# Patient Record
Sex: MALE | Race: WHITE | NOT HISPANIC OR LATINO | Employment: OTHER | URBAN - METROPOLITAN AREA
[De-identification: names, ages, dates, MRNs, and addresses within clinical notes are randomized per-mention and may not be internally consistent; named-entity substitution may affect disease eponyms.]

---

## 2019-11-13 NOTE — PROGRESS NOTES
Electrophysiology Clinic Consult     Brian Contreras  1975  [unfilled]  [unfilled]    11/14/19    DATE OF ADMISSION: (Not on file)  [facility] CARDIOLOGY    Williams Garcia MD  [address] / [place] [address]    Chief Complaint   Patient presents with   • Atrial Fibrillation     Problem List:  1. Paroxysmal atrial fibrillation  a. Diagnosed 10/2019, [facility] ED with atrial fibrillation with RVR, declined JUDI/ECV, initiated on Rythmol, metoprolol, and aspirin, self converted within a few days  b. CHADSVASc = 0, on ASA  c. Echocardiogram 10/17/2019: EF 55 to 60%, trace MR/TR  2. GERD    History of Present Illness:  Patient is a 44 year old male who presents today in consultation by referral from Dr. Chu for further evaluation and management of atrial fibrillation.  He reports intermittent palpitations that he describes as flutters that he has experienced for years.  Over the last two months, he believes he had 3 episodes of atrial fibrillation but was never diagnosed or evaluated on those occasions.  These episodes usually would last no more than 1 day.  In October, he had a more severe episode and was seen in the [facility] the ED and was noted to be in atrial fibrillation with RVR.  He was offered JUDI/ECV but patient declined at that time.  He was subsequently initiated on aspirin, metoprolol, and Rythmol.  He has a CHADSVASc score of 1.  Echocardiogram at that time was overall unremarkable.  He reports he converted back to sinus rhythm spontaneously a few days later.  Patient was moderately symptomatic with c/o tachy-palpitations, shortness of breath, lightheadedness, and fatigue.  He denies any specific triggers or exacerbating factors.  He is a non-smoker, no ETOH, 1 gallon tea/day.  Thyroid studies were normal in the ED.  He denies any hx of HTN or s/s suggestive of RELL.  He feels his GERD is overall well controlled on his Nexium but does have  occasional flare-ups depending on what he eats.  Has had mild fatigue since initiating metoprolol.       No Known Allergies     Cannot display prior to admission medications because the patient has not been admitted in this contact.            Current Outpatient Medications:   •  aspirin  MG tablet, Take 325 mg by mouth Daily., Disp: , Rfl:   •  esomeprazole (nexIUM) 20 MG capsule, Take 20 mg by mouth Every Morning Before Breakfast., Disp: , Rfl:   •  metoprolol tartrate (LOPRESSOR) 50 MG tablet, Take 25 mg by mouth 2 (Two) Times a Day., Disp: , Rfl: 0  •  propafenone (RYTHMOL) 150 MG tablet, 2 (Two) Times a Day., Disp: , Rfl: 0    Social History     Socioeconomic History   • Marital status:      Spouse name: Not on file   • Number of children: Not on file   • Years of education: Not on file   • Highest education level: Not on file   Tobacco Use   • Smoking status: Never Smoker   • Smokeless tobacco: Never Used   Substance and Sexual Activity   • Alcohol use: No     Frequency: Never   • Drug use: No   • Sexual activity: Defer       Family History   Problem Relation Age of Onset   • Diabetes Mother    • Parkinsonism Father    • Colon polyps Sister    • Diabetes Brother    • Fibromyalgia Sister        REVIEW OF SYSTEMS:   CONST:  No weight loss, fever, chills, weakness + fatigue.   HEENT:  No visual loss, blurred vision, double vision, yellow sclerae.                   No hearing loss, congestion, sore throat.   SKIN:      No rashes, urticaria, ulcers, sores.     RESP:     No shortness of breath, hemoptysis, cough, sputum.   GI:           No anorexia, nausea, vomiting, diarrhea. No abdominal pain, melena.   :         No burning on urination, hematuria or increased frequency.  ENDO:    No diaphoresis, cold or heat intolerance. No polyuria or polydipsia.   NEURO:  No headache, dizziness, syncope, paralysis, ataxia, or parasthesias.                  No change in bowel or bladder control. No history of  "CVA/TIA  MUSC:    No muscle, back pain, joint pain or stiffness.   HEME:    No anemia, bleeding, bruising. No history of DVT/PE.  PSYCH:  No history of depression, anxiety    Vitals:    11/14/19 0857   BP: 124/88   BP Location: Right arm   Patient Position: Sitting   Pulse: 64   Weight: 94.8 kg (209 lb)   Height: 175.3 cm (69\")                 Physical Exam:  GEN: Well nourished, well-developed, no acute distress  HEENT: Normocephalic, atraumatic, PERRLA, moist mucous membranes  NECK: Supple, NO JVD, no thyromegaly, no lymphadenopathy  CARD: S1S2, RRR, no murmur, gallop, rub, PMI NL  LUNGS: Clear to auscultation, normal respiratory effort  ABDOMEN: Soft, nontender, normal bowel sounds  EXTREMITIES: No gross deformities, no clubbing, cyanosis, or edema  SKIN: Warm, dry, no lesions  NEURO: No focal deficits, alert and oriented x 3  PSYCHIATRIC: Normal affect and mood      I personally viewed and interpreted the patient's EKG/Telemetry/lab data    No results found for: GLUCOSE, CALCIUM, NA, K, CO2, CL, BUN, CREATININE, EGFRIFAFRI, EGFRIFNONA, BCR, ANIONGAP  No results found for: WBC, HGB, HCT, MCV, PLT  No results found for: INR, PROTIME  No results found for: TSH, W8OPQDQ, L8CZUPQ, THYROIDAB          ECG 12 Lead  Date/Time: 11/14/2019 9:03 AM  Performed by: Manny Puentes MD  Authorized by: Manny Puentes MD   Comparison: not compared with previous ECG   Previous ECG: no previous ECG available  Rhythm: sinus rhythm  BPM: 64                ICD-10-CM ICD-9-CM   1. Paroxysmal atrial fibrillation (CMS/HCC) I48.0 427.31       Assessment and Plan:   1. Paroxysmal atrial fibrillation:  - Newly diagnosed atrial fibrillation with RVR in October.  Patient declined JUDI/ECV at that time and was initiated on Rythmol.  Currently in sinus rhythm with acceptable QRS on EKG today.  No recurrence since initiation of Rythmol.  He does have room to increase dosing if he has recurrent atrial fibrillation.  We did discuss with him " today that he can take one extra dose of Rythmol if he has any recurrent atrial fibrillation.  He can stop his metoprolol today and use on a PRN basis for any afib episodes.  We also discussed that he would be a good candidate for a PVA if he has recurrence despite antiarrhythmics.  - CHADSVASc = 0, on ASA.  Would start NOAC on a PRN basis for any afib episodes.  Will give samples today. Decrease caffeine intake    2. GERD:  - Overall well controlled on Nexium.  Discussed foods to avoid, eating small, frequent meals and avoiding eating before bedtime.    Scribed for Manny uPentes MD by Vannesa Becker, ANTONINO. 11/14/2019  9:03 AM       I, Manny Puentes MD, personally performed the services described in this documentation as scribed by the above named individual in my presence, and it is both accurate and complete.  11/14/2019  9:31 AM

## 2019-11-14 ENCOUNTER — CONSULT (OUTPATIENT)
Dept: CARDIOLOGY | Facility: CLINIC | Age: 44
End: 2019-11-14

## 2019-11-14 VITALS
DIASTOLIC BLOOD PRESSURE: 88 MMHG | WEIGHT: 209 LBS | HEART RATE: 64 BPM | HEIGHT: 69 IN | SYSTOLIC BLOOD PRESSURE: 124 MMHG | BODY MASS INDEX: 30.96 KG/M2

## 2019-11-14 DIAGNOSIS — I48.0 PAROXYSMAL ATRIAL FIBRILLATION (HCC): Primary | ICD-10-CM

## 2019-11-14 PROCEDURE — 93000 ELECTROCARDIOGRAM COMPLETE: CPT | Performed by: INTERNAL MEDICINE

## 2019-11-14 PROCEDURE — 99244 OFF/OP CNSLTJ NEW/EST MOD 40: CPT | Performed by: INTERNAL MEDICINE

## 2019-11-14 RX ORDER — METOPROLOL TARTRATE 50 MG/1
25 TABLET, FILM COATED ORAL AS NEEDED
Qty: 90 TABLET | Refills: 1 | Status: SHIPPED | OUTPATIENT
Start: 2019-11-14

## 2019-11-14 RX ORDER — METOPROLOL TARTRATE 50 MG/1
25 TABLET, FILM COATED ORAL AS NEEDED
Refills: 0 | COMMUNITY
Start: 2019-10-17 | End: 2019-11-14 | Stop reason: SDUPTHER

## 2019-11-14 RX ORDER — ASPIRIN 325 MG
325 TABLET, DELAYED RELEASE (ENTERIC COATED) ORAL DAILY
COMMUNITY

## 2019-11-14 RX ORDER — PROPAFENONE HYDROCHLORIDE 150 MG/1
TABLET, COATED ORAL 2 TIMES DAILY
Refills: 0 | COMMUNITY
Start: 2019-10-18 | End: 2019-12-02 | Stop reason: SDUPTHER

## 2019-12-02 RX ORDER — PROPAFENONE HYDROCHLORIDE 150 MG/1
150 TABLET, COATED ORAL 2 TIMES DAILY
Qty: 60 TABLET | Refills: 6 | Status: SHIPPED | OUTPATIENT
Start: 2019-12-02 | End: 2020-08-10

## 2020-06-04 ENCOUNTER — OFFICE VISIT (OUTPATIENT)
Dept: CARDIOLOGY | Facility: CLINIC | Age: 45
End: 2020-06-04

## 2020-06-04 VITALS
BODY MASS INDEX: 31.73 KG/M2 | HEIGHT: 69 IN | SYSTOLIC BLOOD PRESSURE: 134 MMHG | WEIGHT: 214.2 LBS | HEART RATE: 81 BPM | TEMPERATURE: 97.2 F | OXYGEN SATURATION: 97 % | DIASTOLIC BLOOD PRESSURE: 84 MMHG

## 2020-06-04 DIAGNOSIS — I48.0 PAROXYSMAL ATRIAL FIBRILLATION (HCC): Primary | ICD-10-CM

## 2020-06-04 PROCEDURE — 99213 OFFICE O/P EST LOW 20 MIN: CPT | Performed by: INTERNAL MEDICINE

## 2020-06-04 PROCEDURE — 93000 ELECTROCARDIOGRAM COMPLETE: CPT | Performed by: INTERNAL MEDICINE

## 2020-06-04 NOTE — PROGRESS NOTES
"Brian Contreras  1975  [phone]    06/04/2020    [facility] CARDIOLOGY     Garcia, Williams Peterson MD  [address] [facility] [address]    Chief Complaint   Patient presents with   • PAF       Problem List:   1. Paroxysmal atrial fibrillation  a. Diagnosed 10/2019, [facility] ED with atrial fibrillation with RVR, declined JUDI/ECV, initiated on Rythmol, metoprolol, and aspirin, self converted within a few days  b. CHADSVASc = 0, on ASA  c. Echocardiogram 10/17/2019: EF 55 to 60%, trace MR/TR  2. GERD      Allergies  No Known Allergies    Current Medications    Current Outpatient Medications:   •  apixaban (ELIQUIS) 5 MG tablet tablet, Take 1 tablet by mouth As Needed (a-fib)., Disp: 60 tablet, Rfl: 1  •  aspirin  MG tablet, Take 325 mg by mouth Daily., Disp: , Rfl:   •  esomeprazole (nexIUM) 20 MG capsule, Take 20 mg by mouth Every Morning Before Breakfast., Disp: , Rfl:   •  metoprolol tartrate (LOPRESSOR) 50 MG tablet, Take 0.5 tablets by mouth As Needed (a-fib episodes)., Disp: 90 tablet, Rfl: 1  •  propafenone (RYTHMOL) 150 MG tablet, Take 1 tablet by mouth 2 (Two) Times a Day., Disp: 60 tablet, Rfl: 6    History of Present Illness     Pt presents for follow up of PAF. Since we last saw the pt, pt denies any AF episodes, SOB, CP, LH, and dizziness. Denies any hospitalizations, ER visits, bleeding, or TIA/CVA symptoms. Overall feels well.    ROS:  General:  Denies fatigue, weight gain or loss  Cardiovascular:  Denies CP, PND, syncope, near syncope, edema or palpitations.  Pulmonary:  Denies LLAMAS, cough, or wheezing      Vitals:    06/04/20 1038   BP: 134/84   BP Location: Left arm   Patient Position: Sitting   Pulse: 81   Temp: 97.2 °F (36.2 °C)   SpO2: 97%   Weight: 97.2 kg (214 lb 3.2 oz)   Height: 175.3 cm (69\")     Body mass index is 31.63 kg/m².  PE:  General: NAD  Neck: no JVD, no carotid bruits, no TM  Heart RRR, NL S1, S2,  no rubs, murmurs  Lungs: CTA, no " wheezes, rhonchi, or rales  Abd: soft, non-tender, NL BS  Ext: No musculoskeletal deformities, no edema, cyanosis, or clubbing  Psych: normal mood and affect    Diagnostic Data:        ECG 12 Lead  Date/Time: 6/4/2020 10:47 AM  Performed by: Manny Puentes MD  Authorized by: Manny Puentes MD   Comparison: compared with previous ECG from 11/20/2019  Similar to previous ECG  Rhythm: sinus rhythm  BPM: 80              1. Paroxysmal atrial fibrillation (CMS/HCC)          Plan:  1. Paroxysmal atrial fibrillation: no recurrence on rhythmol doing well  CHADSVASc = 0, on ASA.  Would start NOAC on a PRN basis for any afib episodes.       2. GERD:  - Overall well controlled on Nexium.      F/up in 10 months

## 2020-08-10 RX ORDER — PROPAFENONE HYDROCHLORIDE 150 MG/1
TABLET, COATED ORAL
Qty: 60 TABLET | Refills: 11 | Status: SHIPPED | OUTPATIENT
Start: 2020-08-10 | End: 2021-04-14 | Stop reason: DRUGHIGH

## 2021-04-14 ENCOUNTER — OFFICE VISIT (OUTPATIENT)
Dept: CARDIOLOGY | Facility: CLINIC | Age: 46
End: 2021-04-14

## 2021-04-14 VITALS
SYSTOLIC BLOOD PRESSURE: 136 MMHG | HEIGHT: 69 IN | WEIGHT: 208 LBS | BODY MASS INDEX: 30.81 KG/M2 | OXYGEN SATURATION: 97 % | DIASTOLIC BLOOD PRESSURE: 90 MMHG | HEART RATE: 71 BPM

## 2021-04-14 DIAGNOSIS — I48.0 PAF (PAROXYSMAL ATRIAL FIBRILLATION) (HCC): Primary | ICD-10-CM

## 2021-04-14 DIAGNOSIS — I48.0 PAROXYSMAL ATRIAL FIBRILLATION (HCC): Primary | ICD-10-CM

## 2021-04-14 PROCEDURE — 99214 OFFICE O/P EST MOD 30 MIN: CPT | Performed by: INTERNAL MEDICINE

## 2021-04-14 PROCEDURE — 93000 ELECTROCARDIOGRAM COMPLETE: CPT | Performed by: INTERNAL MEDICINE

## 2021-04-14 NOTE — PROGRESS NOTES
Brian Contreras  1975  [phone]    04/14/2021    [facility] CARDIOLOGY     Referring Provider: No ref. provider found     Williams Garcia MD  [address] [facility] [address]    Chief Complaint   Patient presents with   • PAF       Problem List:     1. Paroxysmal atrial fibrillation  a. Diagnosed 10/2019, [facility] ED with atrial fibrillation with RVR, declined JUDI/ECV, initiated on Rythmol, metoprolol, and aspirin, self converted within a few days  b. CHADSVASc = 0, on ASA  c. Echocardiogram 10/17/2019: EF 55 to 60%, trace MR/TR  2. GERD    Allergies  No Known Allergies    Current Medications    Current Outpatient Medications:   •  apixaban (ELIQUIS) 5 MG tablet tablet, Take 1 tablet by mouth As Needed (a-fib)., Disp: 60 tablet, Rfl: 1  •  aspirin  MG tablet, Take 325 mg by mouth Daily., Disp: , Rfl:   •  esomeprazole (nexIUM) 20 MG capsule, Take 20 mg by mouth Every Morning Before Breakfast., Disp: , Rfl:   •  metoprolol tartrate (LOPRESSOR) 50 MG tablet, Take 0.5 tablets by mouth As Needed (a-fib episodes)., Disp: 90 tablet, Rfl: 1  •  propafenone (RYTHMOL) 150 MG tablet, TAKE 1 TABLET BY MOUTH TWICE A DAY, Disp: 60 tablet, Rfl: 11    History of Present Illness     Pt presents for follow up of PAF. Since we last saw the pt, pt admits to having some occasional episodes of AF 1-2 times weekly triggered by exertion associated with sob and fluttering sensations for a few minutes intermittently throughout the day. He takes lopressor 25 mg as needed for AF episodes and tells me he has had to take this roughly 2 times. Remains on rythmol twice daily. Patient denies any CP, LH, and dizziness. Denies any hospitalizations, ER visits, bleeding issues on ASA, or TIA/CVA symptoms. Overall feels well.     ROS:  General:  Denies fatigue, weight gain or loss  Cardiovascular:  Denies CP, PND, syncope, near syncope, edema + palpitations.  Pulmonary:  Denies LLAMAS, cough, or  "wheezing      Vitals:    04/14/21 0934   BP: 136/90   BP Location: Left arm   Patient Position: Sitting   Pulse: 71   SpO2: 97%   Weight: 94.3 kg (208 lb)   Height: 175.3 cm (69\")     Body mass index is 30.72 kg/m².  PE:  General: NAD  Neck: no JVD, no carotid bruits, no TM  Heart RRR, NL S1, S2, no rubs, murmurs  Lungs: CTA, no wheezes, rhonchi, or rales  Abd: soft, non-tender, NL BS  Ext: No musculoskeletal deformities, no edema, cyanosis, or clubbing  Psych: normal mood and affect    Diagnostic Data:        ECG 12 Lead    Date/Time: 4/14/2021 9:49 AM  Performed by: Manny Puentes MD  Authorized by: Manny Puentes MD   Comparison: compared with previous ECG from 6/4/2020  Similar to previous ECG  Rhythm: sinus rhythm  Rate: normal  BPM: 71              1. Paroxysmal atrial fibrillation (CMS/HCC)        Plan:  1. Paroxysmal atrial fibrillation:   -Weekly breakthrough episodes mainly seconds on Rythmol, associated with sob and fluttering. Increase rhythmol 300 mg po BID with 12 lead ekg 48 hrs later  -CHADSVASc = 0, on ASA.  Continue Eliquis PRN basis for any afib episodes.   - Lopressor 25 mg as needed for breakthrough, continue      2. GERD:  - seems to be poorly controlled on nexium. Discussed limiting caffeine / acidic foods.      F/up in 10 months     Scribed for Manny Puentes MD by ANTONINO Cardoso. 4/14/2021 09:50 EDT     I, Manny Puentes MD, personally performed the services described in this documentation as scribed by the above named individual in my presence, and it is both accurate and complete.  4/14/2021  10:13 EDT    "

## 2021-05-10 RX ORDER — PROPAFENONE HYDROCHLORIDE 300 MG/1
300 TABLET, COATED ORAL 2 TIMES DAILY
Qty: 60 TABLET | Refills: 6 | Status: SHIPPED | OUTPATIENT
Start: 2021-05-10 | End: 2022-01-20

## 2022-01-19 ENCOUNTER — OFFICE VISIT (OUTPATIENT)
Dept: CARDIOLOGY | Facility: CLINIC | Age: 47
End: 2022-01-19

## 2022-01-19 VITALS
OXYGEN SATURATION: 97 % | BODY MASS INDEX: 31.1 KG/M2 | SYSTOLIC BLOOD PRESSURE: 144 MMHG | HEART RATE: 66 BPM | HEIGHT: 69 IN | WEIGHT: 210 LBS | DIASTOLIC BLOOD PRESSURE: 80 MMHG

## 2022-01-19 DIAGNOSIS — I48.0 PAROXYSMAL ATRIAL FIBRILLATION: Primary | ICD-10-CM

## 2022-01-19 DIAGNOSIS — K21.9 GASTROESOPHAGEAL REFLUX DISEASE WITHOUT ESOPHAGITIS: ICD-10-CM

## 2022-01-19 PROCEDURE — 99213 OFFICE O/P EST LOW 20 MIN: CPT | Performed by: INTERNAL MEDICINE

## 2022-01-19 PROCEDURE — 93000 ELECTROCARDIOGRAM COMPLETE: CPT | Performed by: INTERNAL MEDICINE

## 2022-01-19 NOTE — PROGRESS NOTES
"Brian Contreras  1975  [phone]    01/19/2022    [facility] CARDIOLOGY     Referring Provider: No ref. provider found     Williams Garcia MD  [address] [facility] [address]    Chief Complaint   Patient presents with   • Atrial Fibrillation       Problem List:   1. Paroxysmal atrial fibrillation  a. Diagnosed 10/2019, [facility] ED with atrial fibrillation with RVR, declined JUDI/ECV, initiated on Rythmol, metoprolol, and aspirin, self converted within a few days  b. CHADSVASc = 0, on ASA  c. Echocardiogram 10/17/2019: EF 55 to 60%, trace MR/TR  2. GERD    Allergies  No Known Allergies    Current Medications    Current Outpatient Medications:   •  apixaban (ELIQUIS) 5 MG tablet tablet, Take 1 tablet by mouth As Needed (a-fib)., Disp: 60 tablet, Rfl: 1  •  aspirin  MG tablet, Take 325 mg by mouth Daily., Disp: , Rfl:   •  esomeprazole (nexIUM) 20 MG capsule, Take 20 mg by mouth Every Morning Before Breakfast., Disp: , Rfl:   •  metoprolol tartrate (LOPRESSOR) 50 MG tablet, Take 0.5 tablets by mouth As Needed (a-fib episodes)., Disp: 90 tablet, Rfl: 1  •  propafenone (RYTHMOL) 300 MG tablet, Take 1 tablet by mouth 2 (two) times a day., Disp: 60 tablet, Rfl: 6    History of Present Illness     Pt presents for follow up of AF. Since we last saw the pt, pt denies any AF episodes, SOB, CP, LH, and dizziness. Denies any hospitalizations, ER visits, bleeding, or TIA/CVA symptoms. Overall feels well.    ROS:  General:  Denies fatigue, weight gain or loss  Cardiovascular:  Denies CP, PND, syncope, near syncope, edema or palpitations.  Pulmonary:  Denies LLAMAS, cough, or wheezing      Vitals:    01/19/22 1514   Weight: 95.3 kg (210 lb)   Height: 175.3 cm (69\")     Body mass index is 31.01 kg/m².  PE:  General: NAD  Neck: no JVD, no carotid bruits, no TM  Heart RRR, NL S1, S2, S4 present, no rubs, murmurs  Lungs: CTA, no wheezes, rhonchi, or rales  Abd: soft, non-tender, NL " BS  Ext: No musculoskeletal deformities, no edema, cyanosis, or clubbing  Psych: normal mood and affect    Diagnostic Data:        ECG 12 Lead    Date/Time: 1/19/2022 3:20 PM  Performed by: Manny Puentes MD  Authorized by: Manny Puentes MD   Comparison: compared with previous ECG from 4/14/2021  Similar to previous ECG  Rhythm: sinus rhythm  BPM: 66              1. Paroxysmal atrial fibrillation (HCC)    2. Gastroesophageal reflux disease without esophagitis          Plan:    1. Paroxysmal atrial fibrillation:   No recurrence on higher dose of rhythmol  -CHADSVASc = 0, on ASA.  Continue Eliquis PRN basis for any afib episodes.   - Lopressor 25 mg as needed for breakthrough     2. GERD:  - improved      F/up in 12 months

## 2022-01-20 RX ORDER — PROPAFENONE HYDROCHLORIDE 300 MG/1
TABLET, COATED ORAL
Qty: 60 TABLET | Refills: 6 | Status: SHIPPED | OUTPATIENT
Start: 2022-01-20 | End: 2022-08-24 | Stop reason: SDUPTHER

## 2022-08-24 RX ORDER — PROPAFENONE HYDROCHLORIDE 300 MG/1
300 TABLET, COATED ORAL 2 TIMES DAILY
Qty: 60 TABLET | Refills: 5 | Status: SHIPPED | OUTPATIENT
Start: 2022-08-24 | End: 2022-10-31 | Stop reason: SDUPTHER

## 2022-10-31 ENCOUNTER — OFFICE VISIT (OUTPATIENT)
Dept: CARDIOLOGY | Facility: CLINIC | Age: 47
End: 2022-10-31

## 2022-10-31 VITALS
HEART RATE: 69 BPM | WEIGHT: 208.8 LBS | BODY MASS INDEX: 30.93 KG/M2 | DIASTOLIC BLOOD PRESSURE: 81 MMHG | HEIGHT: 69 IN | OXYGEN SATURATION: 99 % | SYSTOLIC BLOOD PRESSURE: 128 MMHG

## 2022-10-31 DIAGNOSIS — I48.0 PAROXYSMAL ATRIAL FIBRILLATION: Primary | ICD-10-CM

## 2022-10-31 PROCEDURE — 93000 ELECTROCARDIOGRAM COMPLETE: CPT | Performed by: PHYSICIAN ASSISTANT

## 2022-10-31 PROCEDURE — 99214 OFFICE O/P EST MOD 30 MIN: CPT | Performed by: PHYSICIAN ASSISTANT

## 2022-10-31 RX ORDER — PROPAFENONE HYDROCHLORIDE 300 MG/1
300 TABLET, COATED ORAL EVERY 8 HOURS
Qty: 60 TABLET | Refills: 5 | Status: SHIPPED | OUTPATIENT
Start: 2022-10-31

## 2022-10-31 NOTE — PROGRESS NOTES
Brian Contreras  1975  [phone]    01/19/2022    [facility] CARDIOLOGY MAIN CAMPUS     Referring Provider: No ref. provider found     Williams Garcia MD  [address]  [facility] [place] [address]    Chief Complaint   Patient presents with   • Paroxysmal atrial fibrillation      10 month follow up- C/O increased Afib episodes.       Problem List:   1. Paroxysmal atrial fibrillation  a. Diagnosed 10/2019, [facility] ED with atrial fibrillation with RVR, declined JUDI/ECV, initiated on Rythmol, metoprolol, and aspirin, self converted within a few days  b. CHADSVASc = 0, on ASA  c. Echocardiogram 10/17/2019: EF 55 to 60%, trace MR/TR  d. Increased Rythmol to 300mg BID  2. GERD    Allergies  No Known Allergies    Current Medications    Current Outpatient Medications:   •  apixaban (ELIQUIS) 5 MG tablet tablet, Take 1 tablet by mouth As Needed (a-fib)., Disp: 60 tablet, Rfl: 1  •  aspirin  MG tablet, Take 325 mg by mouth Daily., Disp: , Rfl:   •  esomeprazole (nexIUM) 20 MG capsule, Take 20 mg by mouth Every Morning Before Breakfast., Disp: , Rfl:   •  metoprolol tartrate (LOPRESSOR) 50 MG tablet, Take 0.5 tablets by mouth As Needed (a-fib episodes)., Disp: 90 tablet, Rfl: 1  •  propafenone (RYTHMOL) 300 MG tablet, Take 1 tablet by mouth 2 (Two) Times a Day., Disp: 60 tablet, Rfl: 5    History of Present Illness     Pt presents for follow up of AF. Since we last saw the pt he states that he has had increased episodes of palpitations.  These are almost on a daily basis.  They are not very long but they do bother him.  He would like to try to increase propafenone as previously had increase his medication and worked well for him.  He is not any chest pain chest tightness testing angina pectoris.  Denies any dizziness, near-syncope or syncope.  He does have heartburn symptoms.  He is taking Nexium but his heartburns been worse lately.    Vitals:    10/31/22 1319   BP: 128/81   BP  "Location: Right arm   Patient Position: Sitting   Cuff Size: Adult   Pulse: 69   SpO2: 99%   Weight: 94.7 kg (208 lb 12.8 oz)   Height: 175.3 cm (69\")     Body mass index is 30.83 kg/m².  PE:  General: NAD  Neck: no JVD, no carotid bruits, no TM  Heart RRR, NL S1, S2, S4 present, no rubs, murmurs  Lungs: CTA, no wheezes, rhonchi, or rales  Abd: soft, non-tender, NL BS  Ext: No musculoskeletal deformities, no edema, cyanosis, or clubbing  Psych: normal mood and affect    Diagnostic Data:        ECG 12 Lead    Date/Time: 10/31/2022 2:41 PM  Performed by: Prosper Tobar PA  Authorized by: Prosper Tobar PA   Rhythm: sinus rhythm  Rate: normal  Conduction: conduction normal  ST Segments: ST segments normal  T Waves: T waves normal  QRS axis: normal  Other: no other findings    Clinical impression: normal ECG            1. Paroxysmal atrial fibrillation (HCC)          Plan:    1. Paroxysmal atrial fibrillation:   Recent breakthrough episodes on Rythmol 200 mg twice daily.  Will increase to 3 times daily.  Will have follow-up EKG 48 hours later.  If symptoms recur we will pursue a monitor.  We also discussed option of PVI.  -CHADSVASc = 0, on ASA.  Continue Eliquis PRN basis for any afib episodes.   - Lopressor 25 mg as needed for breakthrough     2. GERD:  Continue symptoms, Nexium 20 mg daily    Follow-up EKG 40 hours, follow-up in 6 months or sooner as needed.  If symptoms not proved we will pursue a ZIO monitor  Electronically signed by ISAAK Bravo, 10/31/22, 2:43 PM EDT.      "

## 2023-05-17 ENCOUNTER — OFFICE VISIT (OUTPATIENT)
Dept: CARDIOLOGY | Facility: CLINIC | Age: 48
End: 2023-05-17
Payer: COMMERCIAL

## 2023-05-17 VITALS
DIASTOLIC BLOOD PRESSURE: 82 MMHG | SYSTOLIC BLOOD PRESSURE: 124 MMHG | WEIGHT: 210.8 LBS | OXYGEN SATURATION: 98 % | BODY MASS INDEX: 31.22 KG/M2 | HEART RATE: 59 BPM | HEIGHT: 69 IN

## 2023-05-17 DIAGNOSIS — Z79.01 CHRONIC ANTICOAGULATION: ICD-10-CM

## 2023-05-17 DIAGNOSIS — Z79.899 LONG TERM CURRENT USE OF ANTIARRHYTHMIC DRUG: ICD-10-CM

## 2023-05-17 DIAGNOSIS — Z91.89 AT RISK FOR OBSTRUCTIVE SLEEP APNEA: ICD-10-CM

## 2023-05-17 DIAGNOSIS — I48.0 PAROXYSMAL ATRIAL FIBRILLATION: Primary | ICD-10-CM

## 2023-05-17 PROBLEM — K21.9 GERD (GASTROESOPHAGEAL REFLUX DISEASE): Status: ACTIVE | Noted: 2023-05-17

## 2023-05-17 RX ORDER — PROPAFENONE HYDROCHLORIDE 300 MG/1
300 TABLET, COATED ORAL 2 TIMES DAILY
Qty: 180 TABLET | Refills: 3 | Status: SHIPPED | OUTPATIENT
Start: 2023-05-17

## 2023-05-17 RX ORDER — METOPROLOL TARTRATE 50 MG/1
25 TABLET, FILM COATED ORAL AS NEEDED
Qty: 90 TABLET | Refills: 1 | Status: SHIPPED | OUTPATIENT
Start: 2023-05-17

## 2023-05-17 NOTE — PROGRESS NOTES
Encounter Date:05/17/2023      Patient ID: Brian Contreras is a 47 y.o. male.    Williams Garcia MD    Chief Complaint: Paroxysmal atrial fibrillation      PROBLEM LIST:  Patient Active Problem List    Diagnosis Date Noted   • Paroxysmal atrial fibrillation 11/14/2019     Priority: High     Note Last Updated: 5/17/2023     a. Diagnosed 10/2019, [facility] ED with atrial fibrillation with RVR, declined JUDI/ECV, initiated on Rythmol, metoprolol, and aspirin, self converted within a few days  b. CHADSVASc = 0, on ASA  c. Echocardiogram 10/17/2019: EF 55 to 60%, trace MR/TR  d. Increased Rythmol to 300mg BID     • Chronic anticoagulation 05/17/2023     Priority: Medium   • Long term current use of antiarrhythmic drug 05/17/2023     Priority: Low   • GERD (gastroesophageal reflux disease) 05/17/2023               History of Present Illness  Patient presents today for follow-up with a history of paroxysmal atrial fibrillation on long-term antiarrhythmic.  He returns today for scheduled follow-up.  Several months ago he had some breakthrough A-fib.  His propafenone was increased to 300 mg 3 times daily however he has continued to take it twice daily and has had no further symptoms.  He is prescribed Eliquis for as needed use of sustained A-fib.  States he has never had to use it he is also never had to use his metoprolol.  He is compliant with with his proton pump inhibitor.  His only complaint today is fatigue.  He drinks 2-3 caffeinated beverages daily and little to no water.  Also reports that he sleeps 5 to 6 hours per night.  He typically wakes feeling rested, wife tells him that he snores and has apneic episodes.  He has never had a sleep study.  His blood pressure at home typically runs between 120 to 140 mmHg systolic.    No Known Allergies    Current Outpatient Medications   Medication Instructions   • apixaban (ELIQUIS) 5 mg, Oral, As Needed (has never taken)   • aspirin  mg, Oral, Daily  "  • esomeprazole (NEXIUM) 20 mg, Oral, Every Morning Before Breakfast   • metoprolol tartrate (LOPRESSOR) 25 mg, Oral, As Needed   • propafenone (RYTHMOL) 300 mg, Oral, Every 8 Hours (takes twice daily)       .    Objective:     /82 (BP Location: Left arm, Patient Position: Sitting)   Pulse 59   Ht 175.3 cm (69\")   Wt 95.6 kg (210 lb 12.8 oz)   SpO2 98%   BMI 31.13 kg/m²    Body mass index is 31.13 kg/m².     Vitals reviewed.   Constitutional:       Appearance: Well-developed.   Pulmonary:      Effort: Pulmonary effort is normal. No respiratory distress.      Breath sounds: Normal breath sounds. No wheezing. No rales.      Comments: Bases clear  Chest:      Chest wall: Not tender to palpatation.   Cardiovascular:      Normal rate. Regular rhythm.      Murmurs: There is no murmur.      No gallop. No click. No rub.   Pulses:     Intact distal pulses.   Musculoskeletal: Normal range of motion.       Lab Review:         ECG 12 Lead    Date/Time: 5/17/2023 10:11 AM  Performed by: Prosper Barnes PA  Authorized by: Prosper Barnes PA   Previous ECG: no previous ECG available  Rhythm: sinus bradycardia  BPM: 59  Conduction: conduction normal  ST Segments: ST segments normal  QRS axis: normal  Other: no other findings    Clinical impression: normal ECG                       Assessment:      Diagnosis Plan   1. Paroxysmal atrial fibrillation   maintaining sinus rhythm.  He has had no use of metoprolol or Eliquis.  I have spent several minutes educating him on the importance and risk of untreated sleep apnea.  He agrees to evaluation by sleep medicine.    Ambulatory Referral to Sleep Medicine      2. Chronic anticoagulation   refilled Eliquis at current dose      3. Long term current use of antiarrhythmic drug   refilled propafenone 300 mg twice daily.  Also refilled as needed metoprolol      4. At risk for obstructive sleep apnea  Ambulatory Referral to Sleep Medicine        Plan:     Stable cardiac " status.  Continue current medications.   in 1 year, sooner as needed.  Thank you for allowing us to participate in the care of your patient.     Electronically signed by ISAAK Sheppard, 05/17/23, 10:13 AM EDT.

## 2023-05-17 NOTE — LETTER
May 17, 2023     Williams Garcia MD  [address]  [place] [address]    Patient: Brian Contreras   YOB: 1975   Date of Visit: 5/17/2023       Dear Dr. Radha MD:    Thank you for referring Brian Contreras to me for evaluation. Below are the relevant portions of my assessment and plan of care.    If you have questions, please do not hesitate to call me. I look forward to following Brian along with you.         Sincerely,        Manny Puentes MD        CC: No Recipients    Prosper Barnes PA  05/17/23 1015  Signed          Encounter Date:05/17/2023     Patient ID: Brian Contreras is a 47 y.o. male.    Williams Garcia MD    Chief Complaint: Paroxysmal atrial fibrillation      PROBLEM LIST:  Patient Active Problem List    Diagnosis Date Noted   • Paroxysmal atrial fibrillation 11/14/2019     Priority: High     Note Last Updated: 5/17/2023     a. Diagnosed 10/2019, [facility] with atrial fibrillation with RVR, declined JUDI/ECV, initiated on Rythmol, metoprolol, and aspirin, self converted within a few days  b. CHADSVASc = 0, on ASA  c. Echocardiogram 10/17/2019: EF 55 to 60%, trace MR/TR  d. Increased Rythmol to 300mg BID     • Chronic anticoagulation 05/17/2023     Priority: Medium   • Long term current use of antiarrhythmic drug 05/17/2023     Priority: Low   • GERD (gastroesophageal reflux disease) 05/17/2023               History of Present Illness  Patient presents today for follow-up with a history of paroxysmal atrial fibrillation on long-term antiarrhythmic.  He returns today for scheduled follow-up.  Several months ago he had some breakthrough A-fib.  His propafenone was increased to 300 mg 3 times daily however he has continued to take it twice daily and has had no further symptoms.  He is prescribed Eliquis for as needed use of sustained A-fib.  States he has never had to use it he is also never had to use his metoprolol.  He is compliant with with his proton  "pump inhibitor.  His only complaint today is fatigue.  He drinks 2-3 caffeinated beverages daily and little to no water.  Also reports that he sleeps 5 to 6 hours per night.  He typically wakes feeling rested, wife tells him that he snores and has apneic episodes.  He has never had a sleep study.  His blood pressure at home typically runs between 120 to 140 mmHg systolic.    No Known Allergies    Current Outpatient Medications   Medication Instructions   • apixaban (ELIQUIS) 5 mg, Oral, As Needed (has never taken)   • aspirin  mg, Oral, Daily   • esomeprazole (NEXIUM) 20 mg, Oral, Every Morning Before Breakfast   • metoprolol tartrate (LOPRESSOR) 25 mg, Oral, As Needed   • propafenone (RYTHMOL) 300 mg, Oral, Every 8 Hours (takes twice daily)       .    Objective:     /82 (BP Location: Left arm, Patient Position: Sitting)   Pulse 59   Ht 175.3 cm (69\")   Wt 95.6 kg (210 lb 12.8 oz)   SpO2 98%   BMI 31.13 kg/m²   Body mass index is 31.13 kg/m².     Vitals reviewed.   Constitutional:       Appearance: Well-developed.   Pulmonary:      Effort: Pulmonary effort is normal. No respiratory distress.      Breath sounds: Normal breath sounds. No wheezing. No rales.      Comments: Bases clear  Chest:      Chest wall: Not tender to palpatation.   Cardiovascular:      Normal rate. Regular rhythm.      Murmurs: There is no murmur.      No gallop. No click. No rub.   Pulses:     Intact distal pulses.   Musculoskeletal: Normal range of motion.       Lab Review:         ECG 12 Lead    Date/Time: 5/17/2023 10:11 AM  Performed by: Prosper Barnes PA  Authorized by: Prosper Barnes PA   Previous ECG: no previous ECG available  Rhythm: sinus bradycardia  BPM: 59  Conduction: conduction normal  ST Segments: ST segments normal  QRS axis: normal  Other: no other findings    Clinical impression: normal ECG                      Assessment:      Diagnosis Plan   1. Paroxysmal atrial fibrillation   maintaining sinus " rhythm.  He has had no use of metoprolol or Eliquis.  I have spent several minutes educating him on the importance and risk of untreated sleep apnea.  He agrees to evaluation by sleep medicine.    Ambulatory Referral to Sleep Medicine      2. Chronic anticoagulation   refilled Eliquis at current dose      3. Long term current use of antiarrhythmic drug   refilled propafenone 300 mg twice daily.  Also refilled as needed metoprolol      4. At risk for obstructive sleep apnea  Ambulatory Referral to Sleep Medicine        Plan:     Stable cardiac status.  Continue current medications.   in 1 year, sooner as needed.  Thank you for allowing us to participate in the care of your patient.     Electronically signed by ISAAK Sheppard, 05/17/23, 10:13 AM EDT.

## 2023-10-31 ENCOUNTER — TELEPHONE (OUTPATIENT)
Dept: CARDIOLOGY | Facility: CLINIC | Age: 48
End: 2023-10-31

## 2023-10-31 NOTE — TELEPHONE ENCOUNTER
Caller: AZALIA GUTIERREZ    Relationship to patient: Emergency Contact    Best call back number: [phone]     Type of visit: FOLLOW UP     Requested date: ASAP      If rescheduling, when is the original appointment: 6-5-24      Additional notes: PT'S WIFE STATES THAT THE PT HAS BEEN FEELING DIZZY AND NOT FEELING WELL. SHE STATES HIS BP HAS BEEN VERY HIGH LATELY, AS WELL AS HIS HEART RATE. PT'S WIFE IS REQUESTING THE PT BE SEEN ASAP. PLEASE REACH OUT TO FURTHER ADVISE.

## 2023-10-31 NOTE — TELEPHONE ENCOUNTER
I called and spoke with the patient. Patient states for the past couple of weeks his BP has been elevated. I asked patient if he is keeping of log of his BP and he states he has not. Patient states his BP today is 147/96 HR 90. Patient states last week his diastolic was 111 but patient can not recall what his systolic was. Patient states he has been experiencing dizziness and occasional palpitations. Patient denies SOA, Fatigue, or generalized weakness. Patient does not believe he is out of rhythm. Patient states he takes propafenone 300 mg twice daily. Patient states this is the only medication he takes.     I communicated to patient to keep a log of his BP and HR for a week and let us know what they are.     Patient is aware if symptoms worsen to call our office.

## 2024-06-24 ENCOUNTER — OFFICE VISIT (OUTPATIENT)
Dept: CARDIOLOGY | Facility: CLINIC | Age: 49
End: 2024-06-24
Payer: COMMERCIAL

## 2024-06-24 VITALS
HEIGHT: 69 IN | SYSTOLIC BLOOD PRESSURE: 136 MMHG | BODY MASS INDEX: 30.63 KG/M2 | HEART RATE: 62 BPM | WEIGHT: 206.8 LBS | OXYGEN SATURATION: 98 % | DIASTOLIC BLOOD PRESSURE: 90 MMHG

## 2024-06-24 DIAGNOSIS — I48.0 PAROXYSMAL ATRIAL FIBRILLATION: Primary | ICD-10-CM

## 2024-06-24 DIAGNOSIS — Z79.899 LONG TERM CURRENT USE OF ANTIARRHYTHMIC DRUG: ICD-10-CM

## 2024-06-24 PROCEDURE — 99214 OFFICE O/P EST MOD 30 MIN: CPT | Performed by: PHYSICIAN ASSISTANT

## 2024-06-24 PROCEDURE — 93000 ELECTROCARDIOGRAM COMPLETE: CPT | Performed by: PHYSICIAN ASSISTANT

## 2024-06-24 RX ORDER — PROPAFENONE HYDROCHLORIDE 300 MG/1
300 TABLET, COATED ORAL 2 TIMES DAILY
Qty: 180 TABLET | Refills: 3 | Status: SHIPPED | OUTPATIENT
Start: 2024-06-24

## 2024-06-24 NOTE — PROGRESS NOTES
"Brian Contreras  1975  Home phone not available        [facility] CARDIOLOGY         Lopez, Carmelita NORTON, APRN  [address]  [facility] [address]    Chief Complaint   Patient presents with    Paroxysmal atrial fibrillation       Problem List:   Paroxysmal atrial fibrillation  Diagnosed 10/2019, [facility] with atrial fibrillation with RVR, declined JUDI/ECV, initiated on Rythmol, metoprolol, and aspirin, self converted within a few days  CHADSVASc = 0, on ASA  Echocardiogram 10/17/2019: EF 55 to 60%, trace MR/TR  Increased Rythmol to 300mg BID  GERD    Allergies  No Known Allergies    Current Medications    Current Outpatient Medications:     apixaban (ELIQUIS) 5 MG tablet tablet, Take 1 tablet by mouth As Needed (a-fib)., Disp: 60 tablet, Rfl: 1    esomeprazole (nexIUM) 20 MG capsule, Take 1 capsule by mouth Every Morning Before Breakfast., Disp: , Rfl:     metoprolol tartrate (LOPRESSOR) 50 MG tablet, Take 0.5 tablets by mouth As Needed (a-fib episodes)., Disp: 90 tablet, Rfl: 1    propafenone (RYTHMOL) 300 MG tablet, Take 1 tablet by mouth 2 (Two) Times a Day., Disp: 180 tablet, Rfl: 3    History of Present Illness     Pleasant 48-year-old male works full-time as a  in a [employer]  presents for follow up of AF.  He was last seen in our office by Jignesh Barnes May 17, 2023.  He remains on propafenone therapy.  He states he has had no A-fib since last seen by our office.  Denies any chest pain chest tightness dizziness near syncope or syncope.  Overall he feels he is doing well since last seen over a year ago.   Vitals:    06/24/24 1441   BP: 136/90   BP Location: Left arm   Patient Position: Sitting   Cuff Size: Adult   Pulse: 62   SpO2: 98%   Weight: 93.8 kg (206 lb 12.8 oz)   Height: 175.3 cm (69\")     Body mass index is 30.54 kg/m².  PE:  General: NAD  Neck: no JVD, no carotid bruits, no TM  Heart RRR, NL S1, S2, S4 present, no rubs, murmurs  Lungs: CTA, no wheezes, rhonchi, or " rales  Abd: soft, non-tender, NL BS  Ext: No musculoskeletal deformities, no edema, cyanosis, or clubbing  Psych: normal mood and affect    Diagnostic Data:        ECG 12 Lead    Date/Time: 6/24/2024 4:11 PM  Performed by: Prosper Tobar PA    Authorized by: Prosper Tobar PA  Comparison: compared with previous ECG from 5/17/2023  Similar to previous ECG  Rhythm: sinus rhythm  Rate: normal  Conduction: conduction normal  QRS axis: normal  Other: no other findings    Clinical impression: normal ECG          1. Paroxysmal atrial fibrillation    2. Long term current use of antiarrhythmic drug            Plan:    1. Paroxysmal atrial fibrillation: EKG today stable on Rythmol therapy he denies any recurrent clinical episodes of A-fib since last seen by our office.  -CHADSVASc = 0, on ASA.  Continue Eliquis PRN basis for any afib episodes.   - Lopressor 25 mg as needed for breakthrough     2. GERD:  Continue symptoms, Nexium 20 mg daily    Continue current medical therapy, we discussed ablation procedure, PFA he is not interested currently he would like to discuss further next visit.  Return as scheduled.  Electronically signed by ISAAK Bravo, 06/24/24, 4:12 PM EDT.

## 2024-09-28 ENCOUNTER — HOSPITAL ENCOUNTER (EMERGENCY)
Facility: HOSPITAL | Age: 49
Discharge: HOME | End: 2024-09-28
Attending: STUDENT IN AN ORGANIZED HEALTH CARE EDUCATION/TRAINING PROGRAM
Payer: COMMERCIAL

## 2024-09-28 ENCOUNTER — APPOINTMENT (OUTPATIENT)
Dept: GENERAL RADIOLOGY | Facility: HOSPITAL | Age: 49
End: 2024-09-28
Payer: COMMERCIAL

## 2024-09-28 VITALS
TEMPERATURE: 98.3 F | WEIGHT: 205 LBS | HEART RATE: 100 BPM | HEIGHT: 69 IN | DIASTOLIC BLOOD PRESSURE: 97 MMHG | BODY MASS INDEX: 30.36 KG/M2 | RESPIRATION RATE: 18 BRPM | SYSTOLIC BLOOD PRESSURE: 152 MMHG | OXYGEN SATURATION: 96 %

## 2024-09-28 DIAGNOSIS — M25.511 ACUTE PAIN OF RIGHT SHOULDER: Primary | ICD-10-CM

## 2024-09-28 PROCEDURE — 73030 X-RAY EXAM OF SHOULDER: CPT

## 2024-09-28 PROCEDURE — 25010000002 DEXAMETHASONE SODIUM PHOSPHATE 10 MG/ML SOLUTION: Performed by: NURSE PRACTITIONER

## 2024-09-28 PROCEDURE — 96372 THER/PROPH/DIAG INJ SC/IM: CPT

## 2024-09-28 PROCEDURE — 99283 EMERGENCY DEPT VISIT LOW MDM: CPT

## 2024-09-28 PROCEDURE — 25010000002 KETOROLAC TROMETHAMINE PER 15 MG: Performed by: NURSE PRACTITIONER

## 2024-09-28 RX ORDER — DEXAMETHASONE SODIUM PHOSPHATE 10 MG/ML
10 INJECTION, SOLUTION INTRAMUSCULAR; INTRAVENOUS ONCE
Status: COMPLETED | OUTPATIENT
Start: 2024-09-28 | End: 2024-09-28

## 2024-09-28 RX ORDER — KETOROLAC TROMETHAMINE 30 MG/ML
30 INJECTION, SOLUTION INTRAMUSCULAR; INTRAVENOUS ONCE
Status: COMPLETED | OUTPATIENT
Start: 2024-09-28 | End: 2024-09-28

## 2024-09-28 RX ADMIN — KETOROLAC TROMETHAMINE 30 MG: 30 INJECTION, SOLUTION INTRAMUSCULAR; INTRAVENOUS at 20:02

## 2024-09-28 RX ADMIN — DEXAMETHASONE SODIUM PHOSPHATE 10 MG: 10 INJECTION INTRAMUSCULAR; INTRAVENOUS at 20:02

## 2024-09-28 NOTE — DISCHARGE INSTRUCTIONS
Recommend follow-up with orthopedics.  Recommend nonemergent MRI via radiologist recommendation.  Please note there was soft tissue swelling of the right shoulder.  No acute fracture noted.  There was some mild AC joint arthritis.  Also noted on humeral head lucent lesion.

## 2024-09-29 NOTE — ED PROVIDER NOTES
"Subjective:  History of Present Illness:    Patient is a 49-year-old male without contributing health history.  Presents to the ER today with right shoulder pain.  Patient reports that he slipped yesterday injuring her right shoulder.  Reports that he has decreased range of motion in right shoulder.  Reports that he is distal neurovascular intact in right fingers wrist and the elbow.  Reports distal neurovascularly intact in the fingers.  He denies OTC medication home remedy.  Denies alleviating or exacerbating factors.    Nurses Notes reviewed and agree, including vitals, allergies, social history and prior medical history.     REVIEW OF SYSTEMS: All systems reviewed and not pertinent unless noted.  Review of Systems   Musculoskeletal:         Right shoulder pain   All other systems reviewed and are negative.      Past Medical History:   Diagnosis Date    Abnormal ECG 2019    Acid reflux     Arrhythmia 2019    Atrial fibrillation        Allergies:    Patient has no known allergies.      Past Surgical History:   Procedure Laterality Date    DENTAL PROCEDURE           Social History     Socioeconomic History    Marital status:    Tobacco Use    Smoking status: Never    Smokeless tobacco: Never   Vaping Use    Vaping status: Never Used   Substance and Sexual Activity    Alcohol use: No    Drug use: No    Sexual activity: Yes     Partners: Female         Family History   Problem Relation Age of Onset    Diabetes Mother     Parkinsonism Father     Colon polyps Sister     Hypertension Sister     Diabetes Brother     Fibromyalgia Sister     No Known Problems Brother        Objective  Physical Exam:  /97   Pulse 100   Temp 98.3 °F (36.8 °C)   Resp 18   Ht 175.3 cm (69\")   Wt 93 kg (205 lb)   SpO2 96%   BMI 30.27 kg/m²      Physical Exam  Vitals and nursing note reviewed.   Constitutional:       Appearance: Normal appearance. He is normal weight.   HENT:      Head: Normocephalic and atraumatic.      " Nose: Nose normal.      Mouth/Throat:      Mouth: Mucous membranes are moist.      Pharynx: Oropharynx is clear.   Eyes:      Extraocular Movements: Extraocular movements intact.      Conjunctiva/sclera: Conjunctivae normal.      Pupils: Pupils are equal, round, and reactive to light.   Cardiovascular:      Pulses: Normal pulses.   Pulmonary:      Effort: Pulmonary effort is normal.   Abdominal:      General: Abdomen is flat.   Musculoskeletal:      Cervical back: Normal range of motion and neck supple.      Comments: There is decreased range of motion in right shoulder.   Skin:     General: Skin is warm.      Capillary Refill: Capillary refill takes less than 2 seconds.   Neurological:      General: No focal deficit present.      Mental Status: He is alert and oriented to person, place, and time. Mental status is at baseline.   Psychiatric:         Mood and Affect: Mood normal.         Behavior: Behavior normal.         Thought Content: Thought content normal.         Judgment: Judgment normal.         Procedures    ED Course:         Lab Results (last 24 hours)       ** No results found for the last 24 hours. **             XR Shoulder 2+ View Right    Result Date: 9/28/2024  FINAL REPORT TECHNIQUE: null CLINICAL HISTORY: Right shoulder pain after fall yesterday COMPARISON: null FINDINGS: Exam: 3 view of the right shoulder Comparison: None Clinical history: Right shoulder pain after fall yesterday Findings: Soft tissue edema seen over the right shoulder. Mild acromioclavicular joint arthritis. No glenohumeral joint arthritis. No visualized clavicle fracture. No AC separation. No glenohumeral fracture or dislocation. No soft tissue calcification to suggest calcific tendinitis Lucent lesion in the right humeral head may represent enchondroma bone cyst/geode, metastatic disease or myeloma. Nonemergent MRI of the right shoulder with and without contrast recommended for follow-up.     Impression: Impression: 1. Soft  tissue swelling of the shoulder. No acute glenohumeral fracture or dislocation. 2. Mild AC joint arthritis. No AC separation. 3. Lucent lesion in the right humeral head may represent enchondroma bone cyst/geode, metastatic disease or myeloma. Nonemergent MRI of the right shoulder with and without contrast recommended for follow-up. Authenticated and Electronically Signed by Toshia Washington MD on 09/28/2024 07:53:19 PM        MDM      Initial impression of presenting illness: Patient is a 49-year-old male without contributing health history.  Presents to the ER today with right shoulder pain.  Patient reports that he slipped yesterday injuring her right shoulder.  Reports that he has decreased range of motion in right shoulder.  Reports that he is distal neurovascular intact in right fingers wrist and the elbow.  Reports distal neurovascularly intact in the fingers.  He denies OTC medication home remedy.  Denies alleviating or exacerbating factors.    DDX: includes but is not limited to: Sprain, strain, fracture or other    Patient arrives stable with vitals interpreted by myself.     Pertinent features from physical exam: There is decreased range of motion in right shoulder.  Range of motion is intact in right side of fingers, right wrist, right elbow.  Distal neurovascularly intact in right side of fingers.  Distal circulation is intact right side of fingers.  Otherwise benign assessment    Initial diagnostic plan: X-ray of right shoulder    Results from initial plan were reviewed and interpreted by me revealing x-ray of right shoulder with the following impression soft tissue swelling of the shoulder.  No acute glenohumeral fracture or dislocation.  Mild AC joint arthritis no AC separation.  Lucent lesion in the right humeral head may represent enchondroma bone cyst/geode, metastatic disease or myeloma.  Nonemergent MRI of the right shoulder with and without contrast recommended for follow-up.    Diagnostic  information from other sources: Chart review    Interventions / Re-evaluation: Vital signs stable throughout encounter.  Patient was placed in sling.  Patient received 10 mg dexamethasone and 30 mg of Toradol    Results/clinical rationale were discussed with patient    Consultations/Discussion of results with other physicians: N/A    Disposition plan: Patient is hemodynamically stable nontoxic-appearing appropriate discharge.  Will have patient follow-up with orthopedics.  Patient requested [facility] orthopedics.  Patient to follow-up with PCP in 1 week.  Follow-up ER for new or worse symptoms.  -----        Final diagnoses:   Acute pain of right shoulder          Frank Rivera, APRN  09/28/24 2018

## 2024-10-11 ENCOUNTER — DOCUMENTATION (OUTPATIENT)
Dept: CARDIOLOGY | Facility: CLINIC | Age: 49
End: 2024-10-11
Payer: COMMERCIAL

## 2024-10-11 NOTE — PROGRESS NOTES
10/11/24      Re: Brian Contreras, 1975   Procedure/Surgery -shoulder surgery            Brian Contreras, 1975 is a 49-year-old gent with no previous cardiovascular disease no chest pain or angina symptoms.  He has known paroxysmal atrial fibrillation well mitigated on propafenone therapy .  He is considered low risk to pursue shoulder surgery from a cardiac/EP standpoint.  Any questions please call [phone].         Sincerely,          ISAAK Bravo PA

## 2025-03-19 ENCOUNTER — PATIENT ROUNDING (BHMG ONLY) (OUTPATIENT)
Dept: CARDIOLOGY | Facility: CLINIC | Age: 50
End: 2025-03-19
Payer: COMMERCIAL

## 2025-03-19 ENCOUNTER — OFFICE VISIT (OUTPATIENT)
Dept: CARDIOLOGY | Facility: CLINIC | Age: 50
End: 2025-03-19
Payer: COMMERCIAL

## 2025-03-19 VITALS
HEIGHT: 69 IN | BODY MASS INDEX: 32.29 KG/M2 | HEART RATE: 81 BPM | DIASTOLIC BLOOD PRESSURE: 88 MMHG | WEIGHT: 218 LBS | SYSTOLIC BLOOD PRESSURE: 132 MMHG | OXYGEN SATURATION: 95 %

## 2025-03-19 DIAGNOSIS — I48.0 PAROXYSMAL ATRIAL FIBRILLATION: Primary | ICD-10-CM

## 2025-03-19 DIAGNOSIS — K21.9 GASTROESOPHAGEAL REFLUX DISEASE, UNSPECIFIED WHETHER ESOPHAGITIS PRESENT: ICD-10-CM

## 2025-03-19 PROCEDURE — 99214 OFFICE O/P EST MOD 30 MIN: CPT | Performed by: INTERNAL MEDICINE

## 2025-03-19 PROCEDURE — 93000 ELECTROCARDIOGRAM COMPLETE: CPT | Performed by: INTERNAL MEDICINE

## 2025-03-19 RX ORDER — IBUPROFEN 400 MG/1
400 TABLET, FILM COATED ORAL EVERY 6 HOURS PRN
COMMUNITY

## 2025-03-19 RX ORDER — BROMPHENIRAMINE MALEATE, PSEUDOEPHEDRINE HYDROCHLORIDE, AND DEXTROMETHORPHAN HYDROBROMIDE 2; 30; 10 MG/5ML; MG/5ML; MG/5ML
SYRUP ORAL
COMMUNITY
Start: 2025-01-02

## 2025-03-19 NOTE — PROGRESS NOTES
"Brian Contreras  1975  [phone]    03/19/2025    [facility] CARDIOLOGY     Referring Provider: No ref. provider found     Carmelita Lopez, APRN  [address] [place] [address]    Chief Complaint   Patient presents with    Paroxysmal atrial fibrillation     Paroxysmal atrial fibrillation  Diagnosed 10/2019, [facility] with atrial fibrillation with RVR, declined JUDI/ECV, initiated on Rythmol, metoprolol, and aspirin, self converted within a few days  CHADSVASc = 0, on ASA  Echocardiogram 10/17/2019: EF 55 to 60%, trace MR/TR  Increased Rythmol to 300mg BID  GERD    Allergies  No Known Allergies    Current Medications    Current Outpatient Medications:     apixaban (ELIQUIS) 5 MG tablet tablet, Take 1 tablet by mouth As Needed (a-fib)., Disp: 60 tablet, Rfl: 1    brompheniramine-pseudoephedrine-DM 30-2-10 MG/5ML syrup, TAKE 10 MLS BY MOUTH EVERY 6 HOURS AS NEEDED, Disp: , Rfl:     esomeprazole (nexIUM) 20 MG capsule, Take 1 capsule by mouth Every Morning Before Breakfast., Disp: , Rfl:     ibuprofen (ADVIL,MOTRIN) 400 MG tablet, Take 1 tablet by mouth Every 6 (Six) Hours As Needed for Mild Pain., Disp: , Rfl:     metoprolol tartrate (LOPRESSOR) 50 MG tablet, Take 0.5 tablets by mouth As Needed (a-fib episodes)., Disp: 90 tablet, Rfl: 1    propafenone (RYTHMOL) 300 MG tablet, Take 1 tablet by mouth 2 (Two) Times a Day., Disp: 180 tablet, Rfl: 3    History of Present Illness     Pt presents for follow up of PAF. Since we last saw the pt, pt denies any AF episodes, SOB, CP, LH, and dizziness. Denies any hospitalizations, ER visits, bleeding, or TIA/CVA symptoms. Overall feels well.  Blood pressure been well-controlled at home.  He does notice short-lived palpitations on occasion.        Vitals:    03/19/25 0951   BP: 132/88   BP Location: Left arm   Patient Position: Sitting   Pulse: 81   SpO2: 95%   Weight: 98.9 kg (218 lb)   Height: 175.3 cm (69\")     Body mass index is 32.19 " kg/m².  PE:  General: NAD  Neck: no JVD, no carotid bruits, no TM  Heart RRR, NL S1, S2, S4 present, no rubs, murmurs  Lungs: CTA, no wheezes, rhonchi, or rales  Abd: soft, non-tender, NL BS  Ext: No musculoskeletal deformities, no edema, cyanosis, or clubbing  Psych: normal mood and affect    Diagnostic Data:        ECG 12 Lead    Date/Time: 3/19/2025 10:10 AM  Performed by: Manny Puentes MD    Authorized by: Manny Puentes MD  Comparison: compared with previous ECG from 6/24/2024  Similar to previous ECG  Rhythm: sinus rhythm  BPM: 80                 1. Paroxysmal atrial fibrillation    2. Gastroesophageal reflux disease, unspecified whether esophagitis present          Plan:    1. Paroxysmal atrial fibrillation: EKG today stable on Rythmol therapy he denies any recurrent clinical episodes of A-fib since last seen by our office.  -CHADSVASc = 0, on ASA.  Continue Eliquis PRN basis for any afib episodes.   - Lopressor 25 mg as needed for breakthrough  Patient would like to pursue pulmonary vein ablation with PFA as he would like to get off of Rythmol.  He understands that he needs to be on Eliquis 5 mg p.o. twice daily 3 weeks prior to the procedure and up to 2 months after the procedure.  He will stop his Rythmol 5 days prior to the procedure.     2. GERD:  Continue symptoms, Nexium 20 mg daily    F/up in 6 months

## 2025-03-19 NOTE — PROGRESS NOTES
"March 19, 2025    Hello, may I speak with Brian Contreras? Yes.    My name is Angeles WALSH      I am  with [facility] CARDIOLOG[facility]  [address]  [address]  [facility] [address] [phone].    Before we get started may I verify your date of birth? 1975, Yes, correct.    I am calling to officially welcome you to our practice and ask about your recent visit. Is this a good time to talk? yes    Tell me about your visit with us. What things went well?  \"Everything went well.\"       We're always looking for ways to make our patients' experiences even better. Do you have recommendations on ways we may improve?  no, \"I don't know how you could; all went great!\"    Overall were you satisfied with your first visit to our practice? Yes, \"absolutely-100%; for the past 4-5 years, always' satisfied.\"       I appreciate you taking the time to speak with me today. Is there anything else I can do for you? no      Thank you, and have a great day.      "

## 2025-03-20 DIAGNOSIS — I48.0 PAROXYSMAL ATRIAL FIBRILLATION: Primary | ICD-10-CM

## 2025-07-11 RX ORDER — PROPAFENONE HYDROCHLORIDE 300 MG/1
300 TABLET, COATED ORAL 2 TIMES DAILY
Qty: 180 TABLET | Refills: 0 | Status: SHIPPED | OUTPATIENT
Start: 2025-07-11

## 2025-08-28 ENCOUNTER — TELEPHONE (OUTPATIENT)
Dept: CARDIOLOGY | Facility: CLINIC | Age: 50
End: 2025-08-28
Payer: COMMERCIAL

## 2025-08-28 ENCOUNTER — PREP FOR SURGERY (OUTPATIENT)
Dept: OTHER | Facility: HOSPITAL | Age: 50
End: 2025-08-28
Payer: COMMERCIAL

## 2025-08-28 DIAGNOSIS — I48.0 PAROXYSMAL ATRIAL FIBRILLATION: Primary | ICD-10-CM

## 2025-08-28 RX ORDER — NITROGLYCERIN 0.4 MG/1
0.4 TABLET SUBLINGUAL
OUTPATIENT
Start: 2025-08-28

## 2025-08-28 RX ORDER — SODIUM CHLORIDE 9 MG/ML
40 INJECTION, SOLUTION INTRAVENOUS AS NEEDED
OUTPATIENT
Start: 2025-08-28

## 2025-08-28 RX ORDER — SODIUM CHLORIDE 0.9 % (FLUSH) 0.9 %
10 SYRINGE (ML) INJECTION AS NEEDED
OUTPATIENT
Start: 2025-08-28

## 2025-08-28 RX ORDER — ACETAMINOPHEN 325 MG/1
650 TABLET ORAL EVERY 4 HOURS PRN
OUTPATIENT
Start: 2025-08-28

## 2025-08-28 RX ORDER — SODIUM CHLORIDE 0.9 % (FLUSH) 0.9 %
3 SYRINGE (ML) INJECTION EVERY 12 HOURS SCHEDULED
OUTPATIENT
Start: 2025-08-28